# Patient Record
Sex: FEMALE | Race: ASIAN | Employment: FULL TIME | URBAN - METROPOLITAN AREA
[De-identification: names, ages, dates, MRNs, and addresses within clinical notes are randomized per-mention and may not be internally consistent; named-entity substitution may affect disease eponyms.]

---

## 2017-12-27 ENCOUNTER — ANESTHESIA EVENT (OUTPATIENT)
Dept: SURGERY | Age: 32
End: 2017-12-27
Payer: SELF-PAY

## 2017-12-28 ENCOUNTER — ANESTHESIA (OUTPATIENT)
Dept: SURGERY | Age: 32
End: 2017-12-28
Payer: SELF-PAY

## 2017-12-28 ENCOUNTER — HOSPITAL ENCOUNTER (OUTPATIENT)
Age: 32
Setting detail: OUTPATIENT SURGERY
Discharge: HOME | End: 2017-12-28
Attending: SURGERY | Admitting: SURGERY
Payer: SELF-PAY

## 2017-12-28 VITALS
RESPIRATION RATE: 12 BRPM | DIASTOLIC BLOOD PRESSURE: 71 MMHG | BODY MASS INDEX: 21.9 KG/M2 | HEART RATE: 72 BPM | OXYGEN SATURATION: 99 % | TEMPERATURE: 98.9 F | SYSTOLIC BLOOD PRESSURE: 125 MMHG | HEIGHT: 67 IN | WEIGHT: 139.55 LBS

## 2017-12-28 LAB — HCG UR QL: NEGATIVE

## 2017-12-28 PROCEDURE — 74011000250 HC RX REV CODE- 250: Performed by: SURGERY

## 2017-12-28 PROCEDURE — 74011000250 HC RX REV CODE- 250

## 2017-12-28 PROCEDURE — 77030011825 HC SUPP SURG PSTOP S2SG -B: Performed by: SURGERY

## 2017-12-28 PROCEDURE — 74011250636 HC RX REV CODE- 250/636: Performed by: ANESTHESIOLOGY

## 2017-12-28 PROCEDURE — 74011250636 HC RX REV CODE- 250/636: Performed by: SURGERY

## 2017-12-28 PROCEDURE — 74011000272 HC RX REV CODE- 272: Performed by: SURGERY

## 2017-12-28 PROCEDURE — 77030008463 HC STPLR SKN PROX J&J -B: Performed by: SURGERY

## 2017-12-28 PROCEDURE — 77030013079 HC BLNKT BAIR HGGR 3M -A: Performed by: NURSE ANESTHETIST, CERTIFIED REGISTERED

## 2017-12-28 PROCEDURE — 77030026438 HC STYL ET INTUB CARD -A: Performed by: NURSE ANESTHETIST, CERTIFIED REGISTERED

## 2017-12-28 PROCEDURE — 77030011264 HC ELECTRD BLD EXT COVD -A: Performed by: SURGERY

## 2017-12-28 PROCEDURE — 76210000020 HC REC RM PH II FIRST 0.5 HR: Performed by: SURGERY

## 2017-12-28 PROCEDURE — 77030018836 HC SOL IRR NACL ICUM -A: Performed by: SURGERY

## 2017-12-28 PROCEDURE — 74011250636 HC RX REV CODE- 250/636

## 2017-12-28 PROCEDURE — 76060000033 HC ANESTHESIA 1 TO 1.5 HR: Performed by: SURGERY

## 2017-12-28 PROCEDURE — 77030020782 HC GWN BAIR PAWS FLX 3M -B

## 2017-12-28 PROCEDURE — 77030011283 HC ELECTRD NDL COVD -A: Performed by: SURGERY

## 2017-12-28 PROCEDURE — 77030016570 HC BLNKT BAIR HGGR 3M -B: Performed by: SURGERY

## 2017-12-28 PROCEDURE — 81025 URINE PREGNANCY TEST: CPT

## 2017-12-28 PROCEDURE — 77030026227 HC FUNL KELLR 2 PCH ALGN -C: Performed by: SURGERY

## 2017-12-28 PROCEDURE — 76210000006 HC OR PH I REC 0.5 TO 1 HR: Performed by: SURGERY

## 2017-12-28 PROCEDURE — 77030032490 HC SLV COMPR SCD KNE COVD -B: Performed by: SURGERY

## 2017-12-28 PROCEDURE — 77030008684 HC TU ET CUF COVD -B: Performed by: NURSE ANESTHETIST, CERTIFIED REGISTERED

## 2017-12-28 PROCEDURE — 76010000149 HC OR TIME 1 TO 1.5 HR: Performed by: SURGERY

## 2017-12-28 PROCEDURE — 77030019908 HC STETH ESOPH SIMS -A: Performed by: NURSE ANESTHETIST, CERTIFIED REGISTERED

## 2017-12-28 PROCEDURE — 77030002933 HC SUT MCRYL J&J -A: Performed by: SURGERY

## 2017-12-28 PROCEDURE — 77030010507 HC ADH SKN DERMBND J&J -B: Performed by: SURGERY

## 2017-12-28 DEVICE — IMPLANTABLE DEVICE: Type: IMPLANTABLE DEVICE | Site: BREAST | Status: FUNCTIONAL

## 2017-12-28 DEVICE — IMPLANT BRST 345CC P4.2CM DIA12.5CM COHESIVE SIL GEL SMOOTH: Type: IMPLANTABLE DEVICE | Site: BREAST | Status: FUNCTIONAL

## 2017-12-28 RX ORDER — SODIUM CHLORIDE 0.9 % (FLUSH) 0.9 %
5-10 SYRINGE (ML) INJECTION AS NEEDED
Status: DISCONTINUED | OUTPATIENT
Start: 2017-12-28 | End: 2017-12-28 | Stop reason: HOSPADM

## 2017-12-28 RX ORDER — ONDANSETRON 2 MG/ML
INJECTION INTRAMUSCULAR; INTRAVENOUS AS NEEDED
Status: DISCONTINUED | OUTPATIENT
Start: 2017-12-28 | End: 2017-12-28 | Stop reason: HOSPADM

## 2017-12-28 RX ORDER — LIDOCAINE HYDROCHLORIDE 20 MG/ML
INJECTION, SOLUTION EPIDURAL; INFILTRATION; INTRACAUDAL; PERINEURAL AS NEEDED
Status: DISCONTINUED | OUTPATIENT
Start: 2017-12-28 | End: 2017-12-28 | Stop reason: HOSPADM

## 2017-12-28 RX ORDER — LIDOCAINE HYDROCHLORIDE AND EPINEPHRINE 10; 10 MG/ML; UG/ML
INJECTION, SOLUTION INFILTRATION; PERINEURAL AS NEEDED
Status: DISCONTINUED | OUTPATIENT
Start: 2017-12-28 | End: 2017-12-28 | Stop reason: HOSPADM

## 2017-12-28 RX ORDER — DEXAMETHASONE SODIUM PHOSPHATE 4 MG/ML
INJECTION, SOLUTION INTRA-ARTICULAR; INTRALESIONAL; INTRAMUSCULAR; INTRAVENOUS; SOFT TISSUE AS NEEDED
Status: DISCONTINUED | OUTPATIENT
Start: 2017-12-28 | End: 2017-12-28 | Stop reason: HOSPADM

## 2017-12-28 RX ORDER — HYDROMORPHONE HYDROCHLORIDE 2 MG/ML
INJECTION, SOLUTION INTRAMUSCULAR; INTRAVENOUS; SUBCUTANEOUS AS NEEDED
Status: DISCONTINUED | OUTPATIENT
Start: 2017-12-28 | End: 2017-12-28 | Stop reason: HOSPADM

## 2017-12-28 RX ORDER — DROSPIRENONE, ETHINYL ESTRADIOL AND LEVOMEFOLATE CALCIUM AND LEVOMEFOLATE CALCIUM 3-0.02(24)
1 KIT ORAL
COMMUNITY

## 2017-12-28 RX ORDER — FENTANYL CITRATE 50 UG/ML
INJECTION, SOLUTION INTRAMUSCULAR; INTRAVENOUS AS NEEDED
Status: DISCONTINUED | OUTPATIENT
Start: 2017-12-28 | End: 2017-12-28 | Stop reason: HOSPADM

## 2017-12-28 RX ORDER — BUPIVACAINE HYDROCHLORIDE AND EPINEPHRINE 2.5; 5 MG/ML; UG/ML
INJECTION, SOLUTION EPIDURAL; INFILTRATION; INTRACAUDAL; PERINEURAL AS NEEDED
Status: DISCONTINUED | OUTPATIENT
Start: 2017-12-28 | End: 2017-12-28 | Stop reason: HOSPADM

## 2017-12-28 RX ORDER — SODIUM CHLORIDE, SODIUM LACTATE, POTASSIUM CHLORIDE, CALCIUM CHLORIDE 600; 310; 30; 20 MG/100ML; MG/100ML; MG/100ML; MG/100ML
125 INJECTION, SOLUTION INTRAVENOUS CONTINUOUS
Status: DISCONTINUED | OUTPATIENT
Start: 2017-12-28 | End: 2017-12-28 | Stop reason: HOSPADM

## 2017-12-28 RX ORDER — PROPOFOL 10 MG/ML
INJECTION, EMULSION INTRAVENOUS AS NEEDED
Status: DISCONTINUED | OUTPATIENT
Start: 2017-12-28 | End: 2017-12-28 | Stop reason: HOSPADM

## 2017-12-28 RX ORDER — SODIUM CHLORIDE 0.9 % (FLUSH) 0.9 %
5-10 SYRINGE (ML) INJECTION EVERY 8 HOURS
Status: DISCONTINUED | OUTPATIENT
Start: 2017-12-28 | End: 2017-12-28 | Stop reason: HOSPADM

## 2017-12-28 RX ORDER — SUCCINYLCHOLINE CHLORIDE 20 MG/ML
INJECTION INTRAMUSCULAR; INTRAVENOUS AS NEEDED
Status: DISCONTINUED | OUTPATIENT
Start: 2017-12-28 | End: 2017-12-28 | Stop reason: HOSPADM

## 2017-12-28 RX ORDER — ROCURONIUM BROMIDE 10 MG/ML
INJECTION, SOLUTION INTRAVENOUS AS NEEDED
Status: DISCONTINUED | OUTPATIENT
Start: 2017-12-28 | End: 2017-12-28 | Stop reason: HOSPADM

## 2017-12-28 RX ORDER — ONDANSETRON 2 MG/ML
4 INJECTION INTRAMUSCULAR; INTRAVENOUS AS NEEDED
Status: DISCONTINUED | OUTPATIENT
Start: 2017-12-28 | End: 2017-12-28 | Stop reason: HOSPADM

## 2017-12-28 RX ORDER — HYDROMORPHONE HYDROCHLORIDE 2 MG/ML
.5-1 INJECTION, SOLUTION INTRAMUSCULAR; INTRAVENOUS; SUBCUTANEOUS
Status: DISCONTINUED | OUTPATIENT
Start: 2017-12-28 | End: 2017-12-28 | Stop reason: HOSPADM

## 2017-12-28 RX ORDER — MIDAZOLAM HYDROCHLORIDE 1 MG/ML
INJECTION, SOLUTION INTRAMUSCULAR; INTRAVENOUS AS NEEDED
Status: DISCONTINUED | OUTPATIENT
Start: 2017-12-28 | End: 2017-12-28 | Stop reason: HOSPADM

## 2017-12-28 RX ORDER — LIDOCAINE HYDROCHLORIDE 10 MG/ML
0.1 INJECTION, SOLUTION EPIDURAL; INFILTRATION; INTRACAUDAL; PERINEURAL AS NEEDED
Status: DISCONTINUED | OUTPATIENT
Start: 2017-12-28 | End: 2017-12-28 | Stop reason: HOSPADM

## 2017-12-28 RX ORDER — CEFAZOLIN SODIUM IN 0.9 % NACL 2 G/50 ML
2 INTRAVENOUS SOLUTION, PIGGYBACK (ML) INTRAVENOUS ONCE
Status: COMPLETED | OUTPATIENT
Start: 2017-12-28 | End: 2017-12-28

## 2017-12-28 RX ADMIN — LIDOCAINE HYDROCHLORIDE 50 MG: 20 INJECTION, SOLUTION EPIDURAL; INFILTRATION; INTRACAUDAL; PERINEURAL at 14:44

## 2017-12-28 RX ADMIN — SODIUM CHLORIDE, SODIUM LACTATE, POTASSIUM CHLORIDE, AND CALCIUM CHLORIDE 125 ML/HR: 600; 310; 30; 20 INJECTION, SOLUTION INTRAVENOUS at 13:05

## 2017-12-28 RX ADMIN — MIDAZOLAM HYDROCHLORIDE 2 MG: 1 INJECTION, SOLUTION INTRAMUSCULAR; INTRAVENOUS at 14:39

## 2017-12-28 RX ADMIN — SUCCINYLCHOLINE CHLORIDE 100 MG: 20 INJECTION INTRAMUSCULAR; INTRAVENOUS at 14:47

## 2017-12-28 RX ADMIN — MIDAZOLAM HYDROCHLORIDE 3 MG: 1 INJECTION, SOLUTION INTRAMUSCULAR; INTRAVENOUS at 14:38

## 2017-12-28 RX ADMIN — ROCURONIUM BROMIDE 20 MG: 10 INJECTION, SOLUTION INTRAVENOUS at 15:10

## 2017-12-28 RX ADMIN — DEXAMETHASONE SODIUM PHOSPHATE 8 MG: 4 INJECTION, SOLUTION INTRA-ARTICULAR; INTRALESIONAL; INTRAMUSCULAR; INTRAVENOUS; SOFT TISSUE at 14:58

## 2017-12-28 RX ADMIN — FENTANYL CITRATE 100 MCG: 50 INJECTION, SOLUTION INTRAMUSCULAR; INTRAVENOUS at 14:58

## 2017-12-28 RX ADMIN — CEFAZOLIN 2 G: 1 INJECTION, POWDER, FOR SOLUTION INTRAMUSCULAR; INTRAVENOUS; PARENTERAL at 14:56

## 2017-12-28 RX ADMIN — FENTANYL CITRATE 150 MCG: 50 INJECTION, SOLUTION INTRAMUSCULAR; INTRAVENOUS at 14:44

## 2017-12-28 RX ADMIN — PROPOFOL 150 MG: 10 INJECTION, EMULSION INTRAVENOUS at 14:46

## 2017-12-28 RX ADMIN — HYDROMORPHONE HYDROCHLORIDE 1 MG: 2 INJECTION, SOLUTION INTRAMUSCULAR; INTRAVENOUS; SUBCUTANEOUS at 15:13

## 2017-12-28 RX ADMIN — ROCURONIUM BROMIDE 5 MG: 10 INJECTION, SOLUTION INTRAVENOUS at 14:45

## 2017-12-28 RX ADMIN — ONDANSETRON 4 MG: 2 INJECTION INTRAMUSCULAR; INTRAVENOUS at 14:58

## 2017-12-28 RX ADMIN — ROCURONIUM BROMIDE 10 MG: 10 INJECTION, SOLUTION INTRAVENOUS at 15:20

## 2017-12-28 RX ADMIN — SUCCINYLCHOLINE CHLORIDE 50 MG: 20 INJECTION INTRAMUSCULAR; INTRAVENOUS at 14:48

## 2017-12-28 RX ADMIN — ROCURONIUM BROMIDE 25 MG: 10 INJECTION, SOLUTION INTRAVENOUS at 14:51

## 2017-12-28 RX ADMIN — SODIUM CHLORIDE, SODIUM LACTATE, POTASSIUM CHLORIDE, AND CALCIUM CHLORIDE: 600; 310; 30; 20 INJECTION, SOLUTION INTRAVENOUS at 15:28

## 2017-12-28 NOTE — H&P
History and Physical    Patient is a 29 y.o. well-developed, well nourished female who presents for bilateral breast augmentation. No past medical history on file. No past surgical history on file. Prior to Admission medications    Not on File     Allergies not on file    General:   No apparent distress. Heart:  Regular rate and rhythm without murmurs or rubs. Lungs:  Clear to ausculation bilaterally; no wheezes, rales or rhonchi. Patient is ready to go to surgery.     Rosa Hamilton MD  12/28/2017

## 2017-12-28 NOTE — BRIEF OP NOTE
BRIEF OPERATIVE NOTE    Date of Procedure: 12/28/2017   Preoperative Diagnosis: COSMETIC  Postoperative Diagnosis: COSMETIC    Procedure(s):  BILATERAL BREAST AUGMENTATION  Surgeon(s) and Role:     * Yvonne Ojeda MD - Primary         Assistant Staff:  Physician Assistant: Polina Herman PA-C    Surgical Staff:  Circ-1: Stephanie Plummer RN  Circ-2: Uziel Enriquez RN  Circ-Relief: Nallely Herrera RN  Physician Assistant: Polina Herman PA-C  Scrub Tech-1: Wilfred Tuttle  Scrub Tech-Relief: Shante Nj  Float Staff: Esthela Olson RN  Event Time In   Incision Start 1508   Incision Close 1553     Anesthesia: General   Estimated Blood Loss: 0  Specimens: * No specimens in log *   Findings: 0   Complications: 0  Implants:   Implant Name Type Inv.  Item Serial No.  Lot No. LRB No. Used Action   IMPL BRST SMTH MP GEL 345ML -- INSPIRA - [id number]  IMPL BRST SMTH MP GEL 345ML -- INSPIRA [id number] [employer] [place] [employer] N/A Right 1 Implanted   IMPL BRST SMTH MP GEL 330ML -- INSPIRA - [id number]   IMPL BRST SMTH MP GEL 330ML -- INSPIRA [id number] [employer] [place] [employer] N/A Left 1 Implanted

## 2017-12-28 NOTE — IP AVS SNAPSHOT
Divyashilpa Francorichie 
 
 
 [address] [facility] 
[phone] Patient: Cora Corbin MRN: [id number] :1985 About your hospitalization You were admitted on:  2017 You last received care in the:  [facility] You were discharged on:  2017 Why you were hospitalized Your primary diagnosis was:  Not on File Things You Need To Do (next 8 weeks) Follow up with YUMIKO Caballero Phone:  [phone] Where:  [address] [facility], [address], [place] [address] Discharge Orders None A check samuel indicates which time of day the medication should be taken. My Medications ASK your physician about these medications Instructions Each Dose to Equal  
 Morning Noon Evening Bedtime BEYAZ 3-0.02-0.451 mg (24) (4) Tab Generic drug:  drospirenone-e.estradiol-lm. FA Your last dose was: Your next dose is: Take 1 Tab by mouth nightly. 1 Tab Discharge Instructions Breast Augmentation Patient Instructions Please come to the office tomorrow at 10:00 am, unless you already have a scheduled appt. Immediately Following Surgery: After surgery you will rest quietly for the first 48 hours. You will be able to walk around the house and perform light daily activities; however, during this time, it is not at all unusual for you to feel some pain, soreness and pressure in the chest area. This will gradually subside and Dr. Sunshine Lomax will give you pain medication to relieve it. You must take the entire prescription of antibiotics. Be sure to lie on your back whenever you rest or sleep. Keep your head elevated for 72 hours after surgery as this will help with swelling.  
 
The surgery bra that you have been put in should be worn constantly during the day and at night. Dr. Teri Perez will see you in the office the day after surgery to check your progress. You will then be allowed to shower two days after surgery and change the bra as needed. When taking a shower, remove the bra and take off the gauze. The small white tapes that are under the gauze directly over your incision should be left on. Wash yourself everywhere, including the tapes and your incisions. Use mild soap and pat yourself dry and put the bra back on. You dont need to cover the small white tapes over your incision. This daily routine will help keep the incisions clean, and will promote wound healing. Do not submerge yourself in a bath, swimming pool, or whirlpool for two weeks. You will wear the sports bra for a total of two to three weeks after surgery. The small tape strips covering your incisions. These will remain in place for seven days and will peel off by themselves. A few patients react to the anesthetic after surgery with nausea and vomiting. This usually lasts less than 24 hours and should be treated with lots of fluids. Dr. Teri Perez will prescribe nausea medicine for during your preoperative visit. The maximum swelling occurs at about three days and then begins to dramatically improve. Mild bruising typically resolves within 14 days. You should plan to be off work for up to five to seven days, although this can vary from person to person. Additional Post-Operative Instructions: No heavy exercise or lifting for three weeks following surgery. This will allow the implants to remain in the proper position without movement. For the first three days following surgery you should try to restrict your arm movements. Move your arms slowly and avoid sudden jerky movements of the chest and breast area. Keep your arms as close to your body as possible. This allows the implants to remain in place. Dr. Dahlia Phoenix encourages walking immediately after surgery. This activity will greatly minimize the risk of blood clots in your leg veins. Do not expose your breasts to the sun for eight weeks after surgery. Please notify Dr. Dahlia Phoenix if: 
? If your one breast becomes significantly larger than the other ? If you develop significant bruising across the chest  
? If you experience a significant increase in pain in the breast after the pain has improved ? If you develop a temperature above 101.5° F 
? If you develop redness (like a sunburn) around your incisions If you need help or have any questions feel free to call Dr Dahlia Phoenix with your concerns. Dr. Dahlia Phoenix is on call 24 hours per day, seven days per week and has an answering service to forward calls. Breast Massage Following Breast Augmentation You will be taught how to perform the breast massage exercises during your post operative visits. The purpose of these exercises is to keep the scar tissue that forms around implants as soft as possible. By performing these exercises as described, you can help soften the internal scar, minimize the risk of significant capsular contracture and maximize the likelihood of a soft, natural feel and appearance to your breast. 
 
Begin doing the exercises two weeks after surgery. Dr. Dahlia Phoenix will show you the technique during your second post-operative visit. It is important to remember that slow steady massage is more effective than quick jerky movements. Don't worry about injuring the implant; you cannot cause a rupture with these exercises. ? Press the breasts slowly and maximally inwards (towards your breast bone) and hold for 10 seconds, then release. Repeat four times. ? Press the breasts apart slowly and maximally outwards and hold for 10 seconds, then release. Repeat four times. ? Repeat for downward movement. ? Repeat for upward movement. ? Perform these exercises four times per day for the first three months. The quality of your cosmetic enhancement may be compromised if you fail to return for any scheduled post-op visits, or follow the pre- and post-operative instructions. Please dont hesitate to report any unusual or concerning changes. Our number is [phone]. DISCHARGE SUMMARY from your Nurse The following personal items collected during your admission are returned to you:  
Dental Appliance: Dental Appliances: None Vision: Visual Aid: Contacts Hearing Aid:   
Jewelry: Jewelry: Bracelet (to signif other) Clothing: Clothing: Other (comment) (street clothes to locker) Other Valuables: Other Valuables: None Valuables sent to safe:   
 
PATIENT INSTRUCTIONS: 
 
After general anesthesia or intravenous sedation, for 24 hours or while taking prescription Narcotics: · Limit your activities · Do not drive and operate hazardous machinery · Do not make important personal or business decisions · Do  not drink alcoholic beverages · If you have not urinated within 8 hours after discharge, please contact your surgeon on call. Report the following to your surgeon: 
· Excessive pain, swelling, redness or odor of or around the surgical area · Temperature over 100.5 · Nausea and vomiting lasting longer than 4 hours or if unable to take medications · Any signs of decreased circulation or nerve impairment to extremity: change in color, persistent  numbness, tingling, coldness or increase pain · Any questions [address] Breathing deep and coughing are very important exercises to do after surgery. Deep breathing and coughing open the little air tubes and air sacks in your lungs. You take deep breaths every day. You may not even notice - it is just something you do when you sigh or yawn. It is a natural exercise you do to keep these air passages open. After surgery, take deep breaths and cough, on purpose. Coughing and deep breathing help prevent bronchitis and pneumonia after surgery. If you had chest or belly surgery, use a pillow as a \"hug socorro\" and hold it tightly to your chest or belly when you cough. DIRECTIONS: 
6. Take 10 to 15 slow deep breaths every hour while awake. 7. Breathe in deeply, and hold it for 2 seconds. 8. Exhale slowly through puckered lips, like blowing up a balloon. 9. After every 4th or 5th deep breath, hug your pillow to your chest or belly and give a hard, deep cough. Yes, it will probably hurt. But doing this exercise is very important part of healing after surgery. Take your pain medicine to help you do this exercise without too much pain. IF YOU HAVE BEEN DIAGNOSED WITH SLEEP APNEA, PLEASE USE YOUR SLEEP APNEA DEVICE OR CPAP MACHINE WHEN YOU INTEND TO NAP AFTER TAKING PAIN MEDICATION. Ankle Pumps Ankle pumps increase the circulation of oxygenated blood to your lower extremities and decrease your risk for circulation problems such as blood clots. They also stretch the muscles, tendons and ligaments in your foot and ankle, and prevent joint contracture in the ankle and foot, especially after surgeries on the legs. It is important to do ankle pump exercises regularly after surgery because immobility increases your risk for developing a blood clot. Your doctor may also have you take an Aspirin for the next few days as well. If your doctor did not ask you to take an Aspirin, consult with him before starting Aspirin therapy on your own. Slowly point your foot forward, feeling the muscles on the top of your lower leg stretch, and hold this position for 5 seconds. Next, pull your foot back toward you as far as possible, stretching the calf muscles, and hold that position for 5 seconds. Repeat with the other foot. Perform 10 repetitions every hour while awake for both ankles if possible (down and then up with the foot once is one repetition). You should feel gentle stretching of the muscles in your lower leg when doing this exercise. If you feel pain, or your range of motion is limited, don't  Push too hard. Only go the limit your joint and muscles will let you go. If you have increasing pain, progressively worsening leg warmth or swelling, STOP the exercise and call your doctor. Below is information about the medications your doctor is prescribing after your visit: 
 
Other information in your discharge envelope: PRESCRIPTIONS PHYSICAL THERAPY PRESCRIPTION 
     APPOINTMENT CARDS [facility] Pamphlet for block or block with On-Q Catheter from Anesthesia Service Medical device information sheets/pamphlets from their  School/work excuse note. /parent work excuse note. These are general instructions for a healthy lifestyle: *  Please give a list of your current medications to your Primary Care Provider. *  Please update this list whenever your medications are discontinued, doses are 
    changed, or new medications (including over-the-counter products) are added. *  Please carry medication information at all times in case of emergency situations. About Smoking No smoking / No tobacco products / Avoid exposure to second hand smoke Surgeon General's Warning:  Quitting smoking now greatly reduces serious risk to your health. Obesity, smoking, and sedentary lifestyle greatly increases your risk for illness and disease. A healthy diet, regular physical exercise & weight monitoring are important for maintaining a healthy lifestyle. Congestive Heart Failure You may be retaining fluid if you have a history of heart failure or if you experience any of the following symptoms:  Weight gain of 3 pounds or more overnight or 5 pounds in a week, increased swelling in our hands or feet or shortness of breath while lying flat in bed. Please call your doctor as soon as you notice any of these symptoms; do not wait until your next office visit. Recognize signs and symptoms of STROKE: 
F - face looks uneven A - arms unable to move or move even S - speech slurred or non-existent T - time-call 911 as soon as signs and symptoms begin-DO NOT go Back to bed or wait to see if you get better-TIME IS BRAIN. Warning signs of HEART ATTACK Call 911 if you have these symptoms · Chest discomfort. Most heart attacks involve discomfort in the center of the chest that lasts more than a few minutes, or that goes away and comes back. It can feel like uncomfortable pressure, squeezing, fullness, or pain. · Discomfort in other areas of the upper body. Symptoms can include pain or discomfort in one or both Arms, the back, neck, jaw, or stomach. ·  Shortness of breath with or without chest discomfort. · Other signs may include breaking out in a cold sweat, nausea, or lightheadedness Don't wait more than five minutes to call 911 - MINUTES MATTER! Fast action can save your life. Calling 911 is almost always the fastest way to get lifesaving treatment. Emergency Medical Services staff can begin treatment when they arrive - up to an hour sooner than if someone gets to the hospital by car. Introducing [facility]! Evita Viveros introduces [employer] patient portal. Now you can access parts of your medical record, email your doctor's office, and request medication refills online. 1. In your internet browser, go to [email or web address] 2. Click on the First Time User? Click Here link in the Sign In box. You will see the New Member Sign Up page. 3. Enter your [employer] Access Code exactly as it appears below.  You will not need to use this code after youve completed the sign-up process. If you do not sign up before the expiration date, you must request a new code. · [employer] Access Code: [email or web address] Expires: 3/28/2018 12:02 PM 
 
4. Enter the last four digits of your Social Security Number (xxxx) and Date of Birth (mm/dd/yyyy) as indicated and click Submit. You will be taken to the next sign-up page. 5. Create a [employer] ID. This will be your [employer] login ID and cannot be changed, so think of one that is secure and easy to remember. 6. Create a [employer] password. You can change your password at any time. 7. Enter your Password Reset Question and Answer. This can be used at a later time if you forget your password. 8. Enter your e-mail address. You will receive e-mail notification when new information is available in [address]. 9. Click Sign Up. You can now view and download portions of your medical record. 10. Click the Download Summary menu link to download a portable copy of your medical information. If you have questions, please visit the Frequently Asked Questions section of the [employer] website. Remember, [employer] is NOT to be used for urgent needs. For medical emergencies, dial 911. Now available from your iPhone and Android! Providers Seen During Your Hospitalization Provider Specialty Primary office phone Verna Duane, MD Plastic Surgery [phone] Your Primary Care Physician (PCP) Primary Care Physician Office Phone Office Fax [place] [phone] You are allergic to the following No active allergies Recent Documentation Height Weight BMI OB Status Smoking Status 1.702 m 63.3 kg 21.86 kg/m2 Having regular periods Never Smoker Emergency Contacts Name Discharge Info Relation Home Work Mobile Hussein Mac DISCHARGE CAREGIVER [3] Friend [5] [phone] Patient Belongings The following personal items are in your possession at time of discharge: 
  Dental Appliances: None  Visual Aid: Contacts      Home Medications: None   Jewelry: Bracelet (to signif other)  Clothing: Other (comment) (street clothes to locker)    Other Valuables: None Please provide this summary of care documentation to your next provider. Signatures-by signing, you are acknowledging that this After Visit Summary has been reviewed with you and you have received a copy. Patient Signature:  ____________________________________________________________ Date:  ____________________________________________________________  
  
[facility] Provider Signature:  ____________________________________________________________ Date:  ____________________________________________________________

## 2017-12-28 NOTE — ANESTHESIA POSTPROCEDURE EVALUATION
Post-Anesthesia Evaluation and Assessment    Patient: Marylu Ray MRN: [id number]  SSN: [id number]    YOB: 1985  Age: 29 y.o. Sex: female       Cardiovascular Function/Vital Signs  Visit Vitals    /71    Pulse 72    Temp 37.2 °C (98.9 °F)    Resp 12    Ht 5' 7\" (1.702 m)    Wt 63.3 kg (139 lb 8.8 oz)    SpO2 99%    BMI 21.86 kg/m2       Patient is status post general anesthesia for Procedure(s):  BILATERAL BREAST AUGMENTATION. Nausea/Vomiting: None    Postoperative hydration reviewed and adequate. Pain:  Pain Scale 1: Numeric (0 - 10) (12/28/17 1645)  Pain Intensity 1: 0 (12/28/17 1645)   Managed    Neurological Status:   Neuro (WDL): Within Defined Limits (12/28/17 1645)  Neuro  Neurologic State: Drowsy (12/28/17 1645)  LUE Motor Response: Purposeful (12/28/17 1645)  LLE Motor Response: Purposeful (12/28/17 1645)  RUE Motor Response: Purposeful (12/28/17 1645)  RLE Motor Response: Purposeful (12/28/17 1645)   At baseline    Mental Status and Level of Consciousness: Arousable    Pulmonary Status:   O2 Device: Room air (12/28/17 1645)   Adequate oxygenation and airway patent    Complications related to anesthesia: None    Post-anesthesia assessment completed.  No concerns    Signed By: Ky Zamora MD     December 28, 2017

## 2017-12-28 NOTE — DISCHARGE INSTRUCTIONS
Breast Augmentation Patient Instructions    Please come to the office tomorrow at 10:00 am, unless you already have a scheduled appt. Immediately Following Surgery:    After surgery you will rest quietly for the first 48 hours. You will be able to walk around the house and perform light daily activities; however, during this time, it is not at all unusual for you to feel some pain, soreness and pressure in the chest area. This will gradually subside and Dr. Toy Mixon will give you pain medication to relieve it. You must take the entire prescription of antibiotics. Be sure to lie on your back whenever you rest or sleep. Keep your head elevated for 72 hours after surgery as this will help with swelling. The surgery bra that you have been put in should be worn constantly during the day and at night. Dr. Toy Mixon will see you in the office the day after surgery to check your progress. You will then be allowed to shower two days after surgery and change the bra as needed. When taking a shower, remove the bra and take off the gauze. The small white tapes that are under the gauze directly over your incision should be left on. Wash yourself everywhere, including the tapes and your incisions. Use mild soap and pat yourself dry and put the bra back on. You dont need to cover the small white tapes over your incision. This daily routine will help keep the incisions clean, and will promote wound healing. Do not submerge yourself in a bath, swimming pool, or whirlpool for two weeks. You will wear the sports bra for a total of two to three weeks after surgery. The small tape strips covering your incisions. These will remain in place for seven days and will peel off by themselves. A few patients react to the anesthetic after surgery with nausea and vomiting. This usually lasts less than 24 hours and should be treated with lots of fluids.   Dr. Toy Mixon will prescribe nausea medicine for during your preoperative visit. The maximum swelling occurs at about three days and then begins to dramatically improve. Mild bruising typically resolves within 14 days. You should plan to be off work for up to five to seven days, although this can vary from person to person. Additional Post-Operative Instructions:    No heavy exercise or lifting for three weeks following surgery. This will allow the implants to remain in the proper position without movement. For the first three days following surgery you should try to restrict your arm movements. Move your arms slowly and avoid sudden jerky movements of the chest and breast area. Keep your arms as close to your body as possible. This allows the implants to remain in place. Dr. Talya Vilchis encourages walking immediately after surgery. This activity will greatly minimize the risk of blood clots in your leg veins. Do not expose your breasts to the sun for eight weeks after surgery. Please notify Dr. Talya Vilchis if:   If your one breast becomes significantly larger than the other   If you develop significant bruising across the chest    If you experience a significant increase in pain in the breast after the pain has improved   If you develop a temperature above 101.5° F   If you develop redness (like a sunburn) around your incisions  If you need help or have any questions feel free to call Dr Talya Vilchis with your concerns. Dr. Talya Vilchis is on call 24 hours per day, seven days per week and has an answering service to forward calls. Breast Massage Following Breast Augmentation   You will be taught how to perform the breast massage exercises during your post operative visits. The purpose of these exercises is to keep the scar tissue that forms around implants as soft as possible.   By performing these exercises as described, you can help soften the internal scar, minimize the risk of significant capsular contracture and maximize the likelihood of a soft, natural feel and appearance to your breast.    Begin doing the exercises two weeks after surgery. Dr. Mariana Lorenz will show you the technique during your second post-operative visit. It is important to remember that slow steady massage is more effective than quick jerky movements. Don't worry about injuring the implant; you cannot cause a rupture with these exercises.  Press the breasts slowly and maximally inwards (towards your breast bone) and hold for 10 seconds, then release. Repeat four times.  Press the breasts apart slowly and maximally outwards and hold for 10 seconds, then release. Repeat four times.  Repeat for downward movement.  Repeat for upward movement.  Perform these exercises four times per day for the first three months. The quality of your cosmetic enhancement may be compromised if you fail to return for any scheduled post-op visits, or follow the pre- and post-operative instructions. Please dont hesitate to report any unusual or concerning changes. Our number is [phone]. DISCHARGE SUMMARY from your Nurse    The following personal items collected during your admission are returned to you:   Dental Appliance: Dental Appliances: None  Vision: Visual Aid: Contacts  Hearing Aid:    Jewelry: Jewelry: Bracelet (to signif other)  Clothing: Clothing: Other (comment) (street clothes to locker)  Other Valuables: Other Valuables: None  Valuables sent to safe:      PATIENT INSTRUCTIONS:    After general anesthesia or intravenous sedation, for 24 hours or while taking prescription Narcotics:  · Limit your activities  · Do not drive and operate hazardous machinery  · Do not make important personal or business decisions  · Do  not drink alcoholic beverages  · If you have not urinated within 8 hours after discharge, please contact your surgeon on call.     Report the following to your surgeon:  · Excessive pain, swelling, redness or odor of or around the surgical area  · Temperature over 100.5  · Nausea and vomiting lasting longer than 4 hours or if unable to take medications  · Any signs of decreased circulation or nerve impairment to extremity: change in color, persistent  numbness, tingling, coldness or increase pain  · Any questions    COUGH AND DEEP BREATHE    Breathing deep and coughing are very important exercises to do after surgery. Deep breathing and coughing open the little air tubes and air sacks in your lungs. You take deep breaths every day. You may not even notice - it is just something you do when you sigh or yawn. It is a natural exercise you do to keep these air passages open. After surgery, take deep breaths and cough, on purpose. Coughing and deep breathing help prevent bronchitis and pneumonia after surgery. If you had chest or belly surgery, use a pillow as a \"hug socorro\" and hold it tightly to your chest or belly when you cough. DIRECTIONS:  6. Take 10 to 15 slow deep breaths every hour while awake. 7. Breathe in deeply, and hold it for 2 seconds. 8. Exhale slowly through puckered lips, like blowing up a balloon. 9. After every 4th or 5th deep breath, hug your pillow to your chest or belly and give a hard, deep cough. Yes, it will probably hurt. But doing this exercise is very important part of healing after surgery. Take your pain medicine to help you do this exercise without too much pain. IF YOU HAVE BEEN DIAGNOSED WITH SLEEP APNEA, PLEASE USE YOUR SLEEP APNEA DEVICE OR CPAP MACHINE WHEN YOU INTEND TO NAP AFTER TAKING PAIN MEDICATION. Ankle Pumps    Ankle pumps increase the circulation of oxygenated blood to your lower extremities and decrease your risk for circulation problems such as blood clots. They also stretch the muscles, tendons and ligaments in your foot and ankle, and prevent joint contracture in the ankle and foot, especially after surgeries on the legs.     It is important to do ankle pump exercises regularly after surgery because immobility increases your risk for developing a blood clot. Your doctor may also have you take an Aspirin for the next few days as well. If your doctor did not ask you to take an Aspirin, consult with him before starting Aspirin therapy on your own. Slowly point your foot forward, feeling the muscles on the top of your lower leg stretch, and hold this position for 5 seconds. Next, pull your foot back toward you as far as possible, stretching the calf muscles, and hold that position for 5 seconds. Repeat with the other foot. Perform 10 repetitions every hour while awake for both ankles if possible (down and then up with the foot once is one repetition). You should feel gentle stretching of the muscles in your lower leg when doing this exercise. If you feel pain, or your range of motion is limited, don't  Push too hard. Only go the limit your joint and muscles will let you go. If you have increasing pain, progressively worsening leg warmth or swelling, STOP the exercise and call your doctor. Below is information about the medications your doctor is prescribing after your visit:    Other information in your discharge envelope:  []     PRESCRIPTIONS  []     PHYSICAL THERAPY PRESCRIPTION  []     APPOINTMENT CARDS  []     Regional Anesthesia Pamphlet for block or block with On-Q Catheter from Anesthesia Service  []     Medical device information sheets/pamphlets from their    []     School/work excuse note. []     /parent work excuse note. These are general instructions for a healthy lifestyle:    *  Please give a list of your current medications to your Primary Care Provider. *  Please update this list whenever your medications are discontinued, doses are      changed, or new medications (including over-the-counter products) are added. *  Please carry medication information at all times in case of emergency situations.     About Smoking  No smoking / No tobacco products / Avoid exposure to second hand smoke    Surgeon General's Warning:  Quitting smoking now greatly reduces serious risk to your health. Obesity, smoking, and sedentary lifestyle greatly increases your risk for illness and disease. A healthy diet, regular physical exercise & weight monitoring are important for maintaining a healthy lifestyle. Congestive Heart Failure  You may be retaining fluid if you have a history of heart failure or if you experience any of the following symptoms:  Weight gain of 3 pounds or more overnight or 5 pounds in a week, increased swelling in our hands or feet or shortness of breath while lying flat in bed. Please call your doctor as soon as you notice any of these symptoms; do not wait until your next office visit. Recognize signs and symptoms of STROKE:  F - face looks uneven  A - arms unable to move or move even  S - speech slurred or non-existent  T - time-call 911 as soon as signs and symptoms begin-DO NOT go         Back to bed or wait to see if you get better-TIME IS BRAIN. Warning signs of HEART ATTACK  Call 911 if you have these symptoms    · Chest discomfort. Most heart attacks involve discomfort in the center of the chest that lasts more than a few minutes, or that goes away and comes back. It can feel like uncomfortable pressure, squeezing, fullness, or pain. · Discomfort in other areas of the upper body. Symptoms can include pain or discomfort in one or both        Arms, the back, neck, jaw, or stomach. ·  Shortness of breath with or without chest discomfort. · Other signs may include breaking out in a cold sweat, nausea, or lightheadedness    Don't wait more than five minutes to call 911 - MINUTES MATTER! Fast action can save your life. Calling 911 is almost always the fastest way to get lifesaving treatment.   Emergency Medical Services staff can begin treatment when they arrive - up to an hour sooner than if someone gets to the hospital by car.

## 2017-12-29 NOTE — OP NOTES
[address] REPORT    Clemente Taveras  MR#: [id number]  : 1985  ACCOUNT #: [de-identified]   DATE OF SERVICE: 2017    PROCEDURE:  Breast augmentation. PREOPERATIVE DIAGNOSIS:  Micromastia. POSTOPERATIVE DIAGNOSIS:  Micromastia. ATTENDING SURGEON:  Ena Gaucher, MD    ASSISTANT:  Sara Pagan PA-C      ANESTHESIA:  General endotracheal.    ESTIMATED BLOOD LOSS:  Minimal.    DRAINS:  None. SPECIMENS:  None. FINDINGS:  None. COUNTS:  Correct x2. DISPOSITION:  Stable to PACU. COMPLICATIONS:  none. BRIEF HISTORY:  The patient is a 37-year-old female presenting for cosmetic breast augmentation. Bilateral breast augmentation via an inframammary submuscular approach using smooth round silicone implants is offered. The overall procedure, incisional approach, expected outcomes and recovery were discussed. The risks, benefits and alternatives to the procedure including but not limited to bleeding, infection, damage to surrounding tissue structures, the need for revisional surgery, seroma, hematoma, capsular contracture, implant rupture, implant deflation, the need for future implant maintenance and surveillance MRIs, partial or total loss of sensation to the nipple, inability to breast feed, hypertrophic scarring and asymmetry were discussed. Postoperative cup size cannot be guaranteed. The patient agreed to proceed. PROCEDURE NOTE:  Preoperative markings were made with the patient in the standing position. Informed consent was obtained. The patient was taken to the operating room and placed supine on the operating table. Sequential compression boots were placed. General endotracheal anesthesia was obtained. A lower body Pao Hugger was placed. The chest was prepped and draped in the usual sterile fashion. The preoperative markings were injected with 40 mL of 0.25% lidocaine with 1:400,000 epinephrine.   Bilateral inframammary incisions 4 cm in length were designed. The right incision was made sharply. Dissection carried down through the deep dermis and superficial subcutaneous tissue in a superior medialward direction, creating a stair-step incision pattern. The inferior medial border of the pectoralis major muscle was seen along the anterior surface of a rib and incised. The subpectoral space was then entered bluntly and dissected superiorly and laterally. Using the lighted retractor, the subpectoral space was explored. Hemostasis was secured. The inferior lateral border of the muscle was released from the chest wall. An Allergan style  mL silicone sizer was placed via a Chandra funnel and the same procedure was repeated on the left side. Symmetry and volume were examined in the reclining position. Small modifications in the pockets were made with blunt dissection. The right breast required additional volume due to a slight depression in the chest wall. The sizer was changed to 345 mL, which optimized symmetry and volume. The patient was then placed in the supine position. The sizers were removed. The pockets were then irrigated with 500 mL of half-strength Betadine solution, 2 L of triple antibiotic solution and 10 mL of 0.5% Marcaine with epinephrine. Gloves were changed. An Allergan style  mL implant, serial #[id number] was presoaked with triple antibiotic solution, gloves were changed, and the implant was placed in the right breast pocket via a Chandra funnel. Orientation was confirmed and the incision was closed. The inframammary incision was tacked from Gerald's fascia to the anterior chest wall fascia with interrupted 3-0 PDS sutures to stabilize the inframammary fold. The wound was then closed with running 2-0 subcutaneous PDS suture, running deep dermal 3-0 Monocryl and a running subcuticular 5-0 Monocryl in the skin.   The same procedure was repeated on the left with a 330 mL implant, serial #[id number]. The breast was closed likewise. Symmetry and volume were satisfactory. The wounds were then dressed with Dermabond, 4 x 4's, Medipore tape, [place] foam and surgical bra was placed. Anesthesia was then discontinued and the patient extubated in the operating room having tolerated the procedure well. The needle, instrument and laparotomy pad counts at the end of the case were correct.       MD Albert Salinas / Gaby Shipley  D: 12/29/2017 07:34     T: 12/29/2017 13:03  JOB #: [id number]

## 2022-12-29 ENCOUNTER — TRANSCRIBE ORDER (OUTPATIENT)
Dept: SCHEDULING | Age: 37
End: 2022-12-29

## 2022-12-29 DIAGNOSIS — Z98.82 BREAST IMPLANT STATUS: Primary | ICD-10-CM

## 2023-01-23 ENCOUNTER — HOSPITAL ENCOUNTER (OUTPATIENT)
Dept: MAMMOGRAPHY | Age: 38
Discharge: HOME | End: 2023-01-23
Attending: SURGERY
Payer: COMMERCIAL

## 2023-01-23 DIAGNOSIS — Z98.82 BREAST IMPLANT STATUS: ICD-10-CM

## 2023-01-23 PROCEDURE — 77063 BREAST TOMOSYNTHESIS BI: CPT

## 2023-04-22 DIAGNOSIS — Z98.82 BREAST IMPLANT STATUS: Primary | ICD-10-CM

## (undated) DEVICE — MEDI-VAC NON-CONDUCTIVE SUCTION TUBING: Brand: CARDINAL HEALTH

## (undated) DEVICE — DRAPE FLD WRM W44XL66IN C6L FOR INTRATEMP SYS THERMABASIN

## (undated) DEVICE — BRA SURG POST-OP LG 42IN --

## (undated) DEVICE — GAUZE SPONGES,12 PLY: Brand: CURITY

## (undated) DEVICE — STERILE POLYISOPRENE POWDER-FREE SURGICAL GLOVES WITH EMOLLIENT COATING: Brand: PROTEXIS

## (undated) DEVICE — BRA SURG POST-OP MED 38IN --

## (undated) DEVICE — BLADE ELECTRODE: Brand: EDGE

## (undated) DEVICE — Z DISCONTINUED PER MEDLINE PACK PROCEDURE SURG PLAS

## (undated) DEVICE — Z DISCONTINUEDSOLUTION PREP 2OZ 10% POVIDONE IOD SCR CAP BTL

## (undated) DEVICE — SUTURE MCRYL SZ 5-0 L18IN ABSRB UD L19MM PS-2 3/8 CIR PRIM Y495G

## (undated) DEVICE — COVER LT HNDL BLU PLAS

## (undated) DEVICE — DRAPE,CHEST,FENES,15X10,STERIL: Brand: MEDLINE

## (undated) DEVICE — BANDAGE COMPR SELF ADH 5 YDX4 IN TAN STRL PREMIERPRO LF

## (undated) DEVICE — DERMABOND SKIN ADH 0.7ML -- DERMABOND ADVANCED 12/BX

## (undated) DEVICE — INFECTION CONTROL KIT SYS

## (undated) DEVICE — ELECTRODE NDL 2.8IN COAT VALLEYLAB

## (undated) DEVICE — SOLUTION IV 250ML 0.9% SOD CHL CLR INJ FLX BG CONT PRT CLSR

## (undated) DEVICE — 3000CC GUARDIAN II: Brand: GUARDIAN

## (undated) DEVICE — SUTURE MCRYL SZ 3-0 L27IN ABSRB UD L26MM SH 1/2 CIR Y416H

## (undated) DEVICE — SOLUTION SCRB 4OZ 10% PVP I POVIDONE IOD TOP PAINT EXIDINE

## (undated) DEVICE — STERILE POLYISOPRENE POWDER-FREE SURGICAL GLOVES: Brand: PROTEXIS

## (undated) DEVICE — 3M™ BAIR HUGGER® UNDERBODY BLANKET, FULL ACCESS, 10 PER CASE 63500: Brand: BAIR HUGGER™

## (undated) DEVICE — SYSTEM IMPL DEL FOR BRST IMPL FUN (SEE COMMENT)

## (undated) DEVICE — LIGHT HANDLE: Brand: DEVON

## (undated) DEVICE — STAPLER SKIN H3.9MM WIRE DIA0.58MM CRWN 6.9MM 35 STPL ROT

## (undated) DEVICE — DEVON™ KNEE AND BODY STRAP 60" X 3" (1.5 M X 7.6 CM): Brand: DEVON

## (undated) DEVICE — TRAY PREP DRY W/ PREM GLV 2 APPL 6 SPNG 2 UNDPD 1 OVERWRAP

## (undated) DEVICE — SUTURE MCRYL SZ 4-0 L27IN ABSRB UD L19MM PS-2 1/2 CIR PRIM Y426H

## (undated) DEVICE — KENDALL SCD EXPRESS SLEEVES, KNEE LENGTH, MEDIUM: Brand: KENDALL SCD

## (undated) DEVICE — SOLUTION IV 1000ML 0.9% SOD CHL